# Patient Record
Sex: FEMALE | Race: WHITE | URBAN - METROPOLITAN AREA
[De-identification: names, ages, dates, MRNs, and addresses within clinical notes are randomized per-mention and may not be internally consistent; named-entity substitution may affect disease eponyms.]

---

## 2017-08-25 ENCOUNTER — THERAPY VISIT (OUTPATIENT)
Dept: PHYSICAL THERAPY | Facility: CLINIC | Age: 42
End: 2017-08-25
Payer: COMMERCIAL

## 2017-08-25 DIAGNOSIS — G44.221 CHRONIC TENSION-TYPE HEADACHE, INTRACTABLE: ICD-10-CM

## 2017-08-25 DIAGNOSIS — M54.2 CERVICALGIA: Primary | ICD-10-CM

## 2017-08-25 PROCEDURE — 97140 MANUAL THERAPY 1/> REGIONS: CPT | Mod: GP | Performed by: PHYSICAL THERAPIST

## 2017-08-25 PROCEDURE — 97110 THERAPEUTIC EXERCISES: CPT | Mod: GP | Performed by: PHYSICAL THERAPIST

## 2017-08-25 PROCEDURE — 97161 PT EVAL LOW COMPLEX 20 MIN: CPT | Mod: GP | Performed by: PHYSICAL THERAPIST

## 2017-08-25 NOTE — PROGRESS NOTES
"[facility] Initial Evaluation    Subjective:    Patient is a 41 year old female presenting with rehab cervical spine hpi. The history is provided by the patient. No  was used.   Yumiko Curiel is a 41 year old female with a cervical spine condition.  Condition occurred with:  Insidious onset.  Condition occurred: for unknown reasons.  This is a chronic and recurrent condition  Yumiko began neck and HA's began in 2nd decade of life. New episode beginning in May 2017 most likely in response to stress at work (lay offs).  Headaches daily and migraines  2 xmonth. .    Patient reports pain:  Upper cervical spine, cervical left side and cervical right side.  Radiates to:  Head and face.  Pain is described as shooting and is intermittent (HAs constant/Neck intermittent) Pain Scale: 2-6/10.  Associated symptoms:  Headache. Pain is the same all the time.  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  Since onset symptoms are unchanged.  Special tests:  MRI (May 2017 - patient reports findings \"normal\" ).  Previous treatment includes chiropractic.    General health as reported by patient is good.                  Barriers include:  None as reported by the patient.    Red flags:  None as reported by the patient.                        Objective:    Standing Alignment:    Cervical/Thoracic:  Dowager's hump, forward head and thoracic kyphosis increased  Shoulder/UE:  Rounded shoulders, protracted scapula L and protracted scapula R                  Flexibility/Screens:   Positive screens:  Cervical  Upper Extremity:    Decreased left upper extremity flexibility at:  Pectoralis Major; Pectoralis Minor and Latissimus    Decreased right upper extremity flexibility present at:  Pectoralis Major; Pectoralis Minor and Latissimus    Spine:  Decreased left spine flexibility:  Upper Trap and " "Levator    Decreased right spine flexibility:  Upper Trap and Levator                  Cervical/Thoracic Evaluation    AROM:  AROM Cervical:    Flexion:            75% \"pulling\"  Extension:       75% with fulcrum at mid-cervical - minimal motion C/T junction  Rotation:         Left: 50%     Right: 50%  Side Bend:      Left: 50%     Right:  50%    Strength: Fair deep neck flexors and lower trapezius muscles  Headaches: cervical and migraine  Cervical Myotomes:  normal                  DTR's:  not assessed          Cervical Dermatomes:  not assessed                    Cervical Palpation:    Tenderness present at Left:    Rhomboids; Upper Trap; Levator; Erector Spinae and Suboccipitals  Tenderness present at Right:    Rhomboids; Upper Trap; Levator; Erector Spinae and Suboccipitals  Functional Tests:  not assessed    Cervical Stability/Joint Clearing:      Left negative at: 1st Rib    Right negative at:  1st Rib                                              General     ROS    Assessment/Plan:      Patient is a 41 year old female with cervical complaints.    Patient has the following significant findings with corresponding treatment plan.                    Diagnosis 1:  Cervicalgia and Headaches      Pain -  hot/cold therapy, US, manual therapy, self management, education, directional preference exercise and home program  Decreased ROM/flexibility - manual therapy, therapeutic exercise, therapeutic activity and home program  Decreased joint mobility - manual therapy, therapeutic exercise, therapeutic activity and home program  Decreased strength - therapeutic exercise, therapeutic activities and home program  Inflammation - cold therapy, US and self management/home program  Impaired muscle performance - neuro re-education and home program  Decreased function - therapeutic activities and home program  Impaired posture - neuro re-education and home program    Therapy Evaluation Codes:   1) History comprised of:   Personal " factors that impact the plan of care:      Time since onset of symptoms.    Comorbidity factors that impact the plan of care are:      Migraines/headaches, Numbness/tingling, Osteoarthritis, Overweight, Pain at night/rest, Seizures, Smoking and hx of fractures.     Medications impacting care: Anti-inflammatory.  2) Examination of Body Systems comprised of:   Body structures and functions that impact the plan of care:      Cervical spine and Head.   Activity limitations that impact the plan of care are:      Reading/Computer work.  3) Clinical presentation characteristics are:   Stable/Uncomplicated.  4) Decision-Making    Low complexity using standardized patient assessment instrument and/or measureable assessment of functional outcome.  Cumulative Therapy Evaluation is: Low complexity.    Previous and current functional limitations:  (See Goal Flow Sheet for this information)    Short term and Long term goals: (See Goal Flow Sheet for this information)     Communication ability:  Patient appears to be able to clearly communicate and understand verbal and written communication and follow directions correctly.  Treatment Explanation - The following has been discussed with the patient:   RX ordered/plan of care  Anticipated outcomes  Possible risks and side effects  This patient would benefit from PT intervention to resume normal activities.   Rehab potential is good.    Frequency:  1 X week, once daily  Duration:  for 12 weeks  Discharge Plan:  Achieve all LTG.  Independent in home treatment program.  Return to previous functional level by discharge.  Reach maximal therapeutic benefit.    Please refer to the daily flowsheet for treatment today, total treatment time and time spent performing 1:1 timed codes.

## 2017-08-25 NOTE — LETTER
"[facility]  [address] [address] [place] [id number]  [phone]    2017    Re: Yumiko Curiel   :   1975  MRN:  [id number]   REFERRING PHYSICIAN:   Adela Reyna    [facility]    Date of Initial Evaluation: 2017  Visits:  Rxs Used: 1  Reason for Referral:     Cervicalgia  Chronic tension-type headache, intractable    EVALUATION SUMMARY    [facility] Initial Evaluation    Subjective:    Patient is a 41 year old female presenting with rehab cervical spine hpi. The history is provided by the patient. No  was used. Yumiko Curiel is a 41 year old female with a cervical spine condition. Condition occurred with:  Insidious onset.  Condition occurred: for unknown reasons. This is a chronic and recurrent condition  Yumiko began neck and HA's began in 2nd decade of life. New episode beginning in May 2017 most likely in response to stress at work (lay offs). Headaches daily and migraines  2 xmonth. Patient reports pain: Upper cervical spine, cervical left side and cervical right side.  Radiates to:  Head and face. Pain is described as shooting and is intermittent (HAs constant/Neck intermittent) Pain Scale: 2-6/10.  Associated symptoms:  Headache. Pain is the same all the time.  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  Since onset symptoms are unchanged.  Special tests:  MRI (May 2017 - patient reports findings \"normal\" ). Previous treatment includes chiropractic.    General health as reported by patient is good. Pertinent medical history includes:  Osteoarthritis, history of fractures, seizures, overweight and smoking.  Medical allergies: yes (adhesive).    Current medications:  Anti-inflammatory (ibuprofen).  Current occupation . Primary job tasks include: Prolonged sitting " "and other (computer work). Barriers include:  None as reported by the patient. Red flags:  None as reported by the patient.    Objective:  Standing Alignment:    Cervical/Thoracic:  Dowager's hump, forward head and thoracic kyphosis increased  Shoulder/UE:  Rounded shoulders, protracted scapula L and protracted scapula R  Flexibility/Screens:   Positive screens:  Cervical  Upper Extremity:    Decreased left upper extremity flexibility at:  Pectoralis Major; Pectoralis Minor and Latissimus  Re: Yumiko Curiel   :   1975    Decreased right upper extremity flexibility present at:  Pectoralis Major; Pectoralis Minor and Latissimus  Spine:  Decreased left spine flexibility:  Upper Trap and Levator  Decreased right spine flexibility:  Upper Trap and Levator     Cervical/Thoracic Evaluation    AROM:  AROM Cervical:  Flexion:            75% \"pulling\"  Extension:       75% with fulcrum at mid-cervical - minimal motion C/T junction  Rotation:         Left: 50%     Right: 50%  Side Bend:      Left: 50%     Right:  50%    Strength: Fair deep neck flexors and lower trapezius muscles  Headaches: cervical and migraine  Cervical Myotomes:  normal    DTR's:  not assessed    Cervical Dermatomes:  not assessed    Cervical Palpation:    Tenderness present at Left:    Rhomboids; Upper Trap; Levator; Erector Spinae and Suboccipitals  Tenderness present at Right:    Rhomboids; Upper Trap; Levator; Erector Spinae and Suboccipitals  Functional Tests:  not assessed    Cervical Stability/Joint Clearing:      Left negative at: 1st Rib    Right negative at:  1st Rib    Assessment/Plan:      Patient is a 41 year old female with cervical complaints.    Patient has the following significant findings with corresponding treatment plan.             Diagnosis 1:  Cervicalgia and Headaches    Pain -  hot/cold therapy, US, manual therapy, self management, education, directional preference exercise and home program  Decreased ROM/flexibility - " manual therapy, therapeutic exercise, therapeutic activity and home program  Decreased joint mobility - manual therapy, therapeutic exercise, therapeutic activity and home program  Decreased strength - therapeutic exercise, therapeutic activities and home program  Inflammation - cold therapy, US and self management/home program  Impaired muscle performance - neuro re-education and home program  Decreased function - therapeutic activities and home program  Re: Yumiko Curiel   :   1975    Impaired posture - neuro re-education and home program    Therapy Evaluation Codes:   1) History comprised of:   Personal factors that impact the plan of care:      Time since onset of symptoms.    Comorbidity factors that impact the plan of care are:      Migraines/headaches, Numbness/tingling, Osteoarthritis, Overweight, Pain at night/rest, Seizures, Smoking and hx of fractures.     Medications impacting care: Anti-inflammatory.  2) Examination of Body Systems comprised of:   Body structures and functions that impact the plan of care:      Cervical spine and Head.   Activity limitations that impact the plan of care are:      Reading/Computer work.  3) Clinical presentation characteristics are:   Stable/Uncomplicated.  4) Decision-Making    Low complexity using standardized patient assessment instrument and/or measureable assessment of functional outcome.  Cumulative Therapy Evaluation is: Low complexity.    Previous and current functional limitations:  (See Goal Flow Sheet for this information)    Short term and Long term goals: (See Goal Flow Sheet for this information)     Communication ability:  Patient appears to be able to clearly communicate and understand verbal and written communication and follow directions correctly.  Treatment Explanation - The following has been discussed with the patient:   RX ordered/plan of care  Anticipated outcomes  Possible risks and side effects  This patient would benefit from PT  intervention to resume normal activities.   Rehab potential is good.    Frequency:  1 X week, once daily  Duration:  for 12 weeks  Discharge Plan:  Achieve all LTG.  Independent in home treatment program.  Return to previous functional level by discharge.  Reach maximal therapeutic benefit.    Thank you for your referral.    INQUIRIES  Therapist: Judy SIMON [address] [address]  Phone: [phone]  Fax: [phone]

## 2017-09-01 ENCOUNTER — THERAPY VISIT (OUTPATIENT)
Dept: PHYSICAL THERAPY | Facility: CLINIC | Age: 42
End: 2017-09-01
Payer: COMMERCIAL

## 2017-09-01 DIAGNOSIS — G44.221 CHRONIC TENSION-TYPE HEADACHE, INTRACTABLE: ICD-10-CM

## 2017-09-01 DIAGNOSIS — M54.2 CERVICALGIA: ICD-10-CM

## 2017-09-01 PROCEDURE — 97112 NEUROMUSCULAR REEDUCATION: CPT | Mod: GP | Performed by: PHYSICAL THERAPIST

## 2017-09-01 PROCEDURE — 97140 MANUAL THERAPY 1/> REGIONS: CPT | Mod: GP | Performed by: PHYSICAL THERAPIST

## 2017-09-01 PROCEDURE — 97110 THERAPEUTIC EXERCISES: CPT | Mod: GP | Performed by: PHYSICAL THERAPIST

## 2017-09-08 ENCOUNTER — THERAPY VISIT (OUTPATIENT)
Dept: PHYSICAL THERAPY | Facility: CLINIC | Age: 42
End: 2017-09-08
Payer: COMMERCIAL

## 2017-09-08 DIAGNOSIS — G44.221 CHRONIC TENSION-TYPE HEADACHE, INTRACTABLE: ICD-10-CM

## 2017-09-08 DIAGNOSIS — M54.2 CERVICALGIA: ICD-10-CM

## 2017-09-08 PROCEDURE — 97110 THERAPEUTIC EXERCISES: CPT | Mod: GP | Performed by: PHYSICAL THERAPIST

## 2017-09-08 PROCEDURE — 97112 NEUROMUSCULAR REEDUCATION: CPT | Mod: GP | Performed by: PHYSICAL THERAPIST

## 2017-09-08 PROCEDURE — 97140 MANUAL THERAPY 1/> REGIONS: CPT | Mod: GP | Performed by: PHYSICAL THERAPIST

## 2017-09-14 ENCOUNTER — THERAPY VISIT (OUTPATIENT)
Dept: PHYSICAL THERAPY | Facility: CLINIC | Age: 42
End: 2017-09-14
Payer: COMMERCIAL

## 2017-09-14 DIAGNOSIS — G44.221 CHRONIC TENSION-TYPE HEADACHE, INTRACTABLE: ICD-10-CM

## 2017-09-14 DIAGNOSIS — M54.2 CERVICALGIA: ICD-10-CM

## 2017-09-14 PROCEDURE — 97112 NEUROMUSCULAR REEDUCATION: CPT | Mod: GP | Performed by: PHYSICAL THERAPIST

## 2017-09-14 PROCEDURE — 97110 THERAPEUTIC EXERCISES: CPT | Mod: GP | Performed by: PHYSICAL THERAPIST

## 2017-09-14 PROCEDURE — 97140 MANUAL THERAPY 1/> REGIONS: CPT | Mod: GP | Performed by: PHYSICAL THERAPIST

## 2017-09-20 ENCOUNTER — THERAPY VISIT (OUTPATIENT)
Dept: PHYSICAL THERAPY | Facility: CLINIC | Age: 42
End: 2017-09-20
Payer: COMMERCIAL

## 2017-09-20 DIAGNOSIS — G44.221 CHRONIC TENSION-TYPE HEADACHE, INTRACTABLE: ICD-10-CM

## 2017-09-20 DIAGNOSIS — M54.2 CERVICALGIA: ICD-10-CM

## 2017-09-20 PROCEDURE — 97110 THERAPEUTIC EXERCISES: CPT | Mod: GP | Performed by: PHYSICAL THERAPIST

## 2017-09-20 PROCEDURE — 97140 MANUAL THERAPY 1/> REGIONS: CPT | Mod: GP | Performed by: PHYSICAL THERAPIST

## 2024-11-19 ENCOUNTER — LAB REQUISITION (OUTPATIENT)
Dept: LAB | Facility: CLINIC | Age: 49
End: 2024-11-19

## 2024-11-19 DIAGNOSIS — Z12.4 ENCOUNTER FOR SCREENING FOR MALIGNANT NEOPLASM OF CERVIX: ICD-10-CM

## 2024-11-19 PROCEDURE — 87624 HPV HI-RISK TYP POOLED RSLT: CPT | Performed by: PHYSICIAN ASSISTANT

## 2024-11-20 LAB
HPV HR 12 DNA CVX QL NAA+PROBE: NEGATIVE
HPV16 DNA CVX QL NAA+PROBE: NEGATIVE
HPV18 DNA CVX QL NAA+PROBE: NEGATIVE
HUMAN PAPILLOMA VIRUS FINAL DIAGNOSIS: NORMAL

## 2024-11-25 LAB
BKR AP ASSOCIATED HPV REPORT: NORMAL
BKR LAB AP GYN ADEQUACY: NORMAL
BKR LAB AP GYN INTERPRETATION: NORMAL
BKR LAB AP LMP: NORMAL
BKR LAB AP PREVIOUS ABNL DX: NORMAL
BKR LAB AP PREVIOUS ABNORMAL: NORMAL
PATH REPORT.COMMENTS IMP SPEC: NORMAL
PATH REPORT.COMMENTS IMP SPEC: NORMAL
PATH REPORT.RELEVANT HX SPEC: NORMAL